# Patient Record
Sex: MALE | Race: WHITE | NOT HISPANIC OR LATINO | URBAN - METROPOLITAN AREA
[De-identification: names, ages, dates, MRNs, and addresses within clinical notes are randomized per-mention and may not be internally consistent; named-entity substitution may affect disease eponyms.]

---

## 2020-07-13 ENCOUNTER — OFFICE VISIT (OUTPATIENT)
Dept: URBAN - METROPOLITAN AREA CLINIC 113 | Facility: CLINIC | Age: 65
End: 2020-07-13

## 2020-07-25 ENCOUNTER — TELEPHONE ENCOUNTER (OUTPATIENT)
Dept: URBAN - METROPOLITAN AREA CLINIC 13 | Facility: CLINIC | Age: 65
End: 2020-07-25

## 2020-07-25 RX ORDER — PREDNISONE 10 MG/1
TABLET ORAL
Qty: 24 | Refills: 0 | OUTPATIENT
Start: 2019-01-28 | End: 2019-08-20

## 2020-07-25 RX ORDER — TRIAMCINOLONE ACETONIDE 1 MG/G
APPLY  AND RUB  IN A THIN FILM TO AFFECTED AREAS TWICE DAILY.(AM AND PM) CREAM TOPICAL
Refills: 0 | OUTPATIENT
Start: 2019-01-28 | End: 2019-08-20

## 2020-07-25 RX ORDER — AZITHROMYCIN DIHYDRATE 250 MG/1
TABLET, FILM COATED ORAL
Qty: 6 | Refills: 0 | OUTPATIENT
Start: 2019-01-07 | End: 2019-08-20

## 2020-07-25 RX ORDER — ASCORBIC ACID 1000 MG
TAKE 1 CAPSULE DAILY TABLET ORAL
Refills: 0 | OUTPATIENT
End: 2015-01-06

## 2020-07-25 RX ORDER — PRAVASTATIN SODIUM 40 MG/1
TAKE 1 TABLET DAILY TABLET ORAL
Refills: 0 | OUTPATIENT
Start: 2014-04-19 | End: 2019-12-09

## 2020-07-25 RX ORDER — BENZONATATE 100 MG/1
TAKE 1 CAPSULE 3 TIMES DAILY CAPSULE ORAL
Refills: 0 | OUTPATIENT
Start: 2019-01-07 | End: 2019-08-20

## 2020-07-25 RX ORDER — EZETIMIBE AND SIMVASTATIN 10; 80 MG/1; MG/1
TAKE 1 TABLET DAILY, PT STATES UNKNOWN DOSE TABLET ORAL
Refills: 0 | OUTPATIENT
End: 2015-01-06

## 2020-07-25 RX ORDER — FAMOTIDINE 40 MG/1
TAKE 1 TABLET DAILY AT BEDTIME TABLET ORAL
Qty: 30 | Refills: 2 | OUTPATIENT
Start: 2013-06-06 | End: 2015-01-06

## 2020-07-25 RX ORDER — METRONIDAZOLE 250 MG/1
TAKE 1 TABLET 3 TIMES DAILY TABLET ORAL
Qty: 30 | Refills: 0 | OUTPATIENT
Start: 2017-07-06 | End: 2017-07-27

## 2020-07-26 ENCOUNTER — TELEPHONE ENCOUNTER (OUTPATIENT)
Dept: URBAN - METROPOLITAN AREA CLINIC 13 | Facility: CLINIC | Age: 65
End: 2020-07-26

## 2020-07-26 RX ORDER — ALPRAZOLAM 0.5 MG/1
TABLET ORAL
Qty: 6 | Refills: 0 | Status: ACTIVE | COMMUNITY
Start: 2018-05-31

## 2020-07-26 RX ORDER — TADALAFIL 20 MG/1
TABLET, COATED ORAL
Qty: 18 | Refills: 0 | Status: ACTIVE | COMMUNITY
Start: 2018-12-13

## 2020-07-26 RX ORDER — TRIAMCINOLONE ACETONIDE 1 MG/G
CREAM TOPICAL
Qty: 454 | Refills: 0 | Status: ACTIVE | COMMUNITY
Start: 2019-01-28

## 2020-07-26 RX ORDER — PREDNISONE 5 MG/1
TABLET ORAL
Qty: 21 | Refills: 0 | Status: ACTIVE | COMMUNITY
Start: 2020-04-30

## 2020-07-26 RX ORDER — BENZONATATE 200 MG/1
CAPSULE ORAL
Qty: 30 | Refills: 0 | Status: ACTIVE | COMMUNITY
Start: 2013-01-31

## 2020-07-26 RX ORDER — PANTOPRAZOLE SODIUM 40 MG/1
TABLET, DELAYED RELEASE ORAL
Qty: 30 | Refills: 0 | Status: ACTIVE | COMMUNITY
Start: 2011-10-19

## 2020-07-26 RX ORDER — PREDNISONE 10 MG/1
TAKE 3TABS IN AM X4 DAYS, 2TABS DAILY X4 DAYS, THEN 1TAB DAILY X4 DAYS TABLET ORAL
Qty: 30 | Refills: 0 | Status: ACTIVE | COMMUNITY
Start: 2012-06-02

## 2020-07-26 RX ORDER — KETOCONAZOLE 200 MG/1
TABLET ORAL
Qty: 24 | Refills: 0 | Status: ACTIVE | COMMUNITY
Start: 2012-06-22

## 2020-07-26 RX ORDER — TRIAZOLAM 0.25 MG/1
TABLET ORAL
Qty: 12 | Refills: 0 | Status: ACTIVE | COMMUNITY
Start: 2014-08-19

## 2020-07-26 RX ORDER — BENZONATATE 200 MG/1
CAPSULE ORAL
Qty: 30 | Refills: 0 | Status: ACTIVE | COMMUNITY
Start: 2013-01-23

## 2020-07-26 RX ORDER — TRAMADOL HYDROCHLORIDE AND ACETAMINOPHEN 37.5; 325 MG/1; MG/1
TABLET, FILM COATED ORAL
Qty: 60 | Refills: 0 | Status: ACTIVE | COMMUNITY
Start: 2012-08-15

## 2020-07-26 RX ORDER — EZETIMIBE AND SIMVASTATIN 10; 40 MG/1; MG/1
TABLET ORAL
Qty: 90 | Refills: 0 | Status: ACTIVE | COMMUNITY
Start: 2011-10-29

## 2020-07-26 RX ORDER — PRAVASTATIN SODIUM 40 MG/1
TABLET ORAL
Qty: 90 | Refills: 0 | Status: ACTIVE | COMMUNITY
Start: 2014-04-19

## 2020-07-26 RX ORDER — ROSUVASTATIN CALCIUM 10 MG/1
TABLET, FILM COATED ORAL
Qty: 90 | Refills: 0 | Status: ACTIVE | COMMUNITY
Start: 2019-03-19

## 2020-07-26 RX ORDER — ROSUVASTATIN CALCIUM 10 MG/1
TAKE 1 TABLET DAILY TABLET, FILM COATED ORAL
Refills: 0 | Status: ACTIVE | COMMUNITY
Start: 2019-02-20

## 2020-07-26 RX ORDER — PREDNISONE 10 MG/1
TABLET ORAL
Qty: 24 | Refills: 0 | Status: ACTIVE | COMMUNITY
Start: 2019-01-28

## 2020-07-26 RX ORDER — LEVOFLOXACIN 750 MG/1
TABLET, FILM COATED ORAL
Qty: 7 | Refills: 0 | Status: ACTIVE | COMMUNITY
Start: 2012-12-26

## 2020-07-26 RX ORDER — AZITHROMYCIN DIHYDRATE 250 MG/1
TABLET, FILM COATED ORAL
Qty: 6 | Refills: 0 | Status: ACTIVE | COMMUNITY
Start: 2011-11-07

## 2020-07-26 RX ORDER — BENZONATATE 100 MG/1
CAPSULE ORAL
Qty: 20 | Refills: 0 | Status: ACTIVE | COMMUNITY
Start: 2014-04-16

## 2020-07-26 RX ORDER — CYCLOBENZAPRINE HYDROCHLORIDE 10 MG/1
TABLET, FILM COATED ORAL
Qty: 30 | Refills: 0 | Status: ACTIVE | COMMUNITY
Start: 2020-04-30

## 2020-07-26 RX ORDER — TADALAFIL 20 MG/1
TABLET, FILM COATED ORAL
Qty: 6 | Refills: 0 | Status: ACTIVE | COMMUNITY
Start: 2011-10-10

## 2020-07-26 RX ORDER — TRAMADOL HYDROCHLORIDE 50 MG/1
TAKE 1 TABLET BY MOUTH TWICE A DAY AS NEEDED TABLET ORAL
Qty: 20 | Refills: 0 | Status: ACTIVE | COMMUNITY
Start: 2020-04-30

## 2020-07-26 RX ORDER — IBUPROFEN 600 MG/1
TABLET ORAL
Qty: 30 | Refills: 0 | Status: ACTIVE | COMMUNITY
Start: 2013-05-04

## 2020-07-26 RX ORDER — CLONAZEPAM 1 MG/1
TABLET ORAL
Qty: 12 | Refills: 0 | Status: ACTIVE | COMMUNITY
Start: 2020-06-26

## 2020-07-26 RX ORDER — PANTOPRAZOLE SODIUM 40 MG/1
TAKE 1 TABLET DAILY TABLET, DELAYED RELEASE ORAL
Refills: 0 | Status: ACTIVE | COMMUNITY

## 2020-07-26 RX ORDER — MUPIROCIN 20 MG/G
OINTMENT TOPICAL
Qty: 22 | Refills: 0 | Status: ACTIVE | COMMUNITY
Start: 2014-05-21

## 2020-07-26 RX ORDER — CEPHALEXIN 500 MG/1
CAPSULE ORAL
Qty: 14 | Refills: 0 | Status: ACTIVE | COMMUNITY
Start: 2014-05-21

## 2020-07-26 RX ORDER — KETOCONAZOLE 200 MG/1
TABLET ORAL
Qty: 26 | Refills: 0 | Status: ACTIVE | COMMUNITY
Start: 2020-03-30

## 2020-07-26 RX ORDER — AZITHROMYCIN DIHYDRATE 250 MG/1
TABLET, FILM COATED ORAL
Qty: 6 | Refills: 0 | Status: ACTIVE | COMMUNITY
Start: 2013-01-31

## 2020-07-26 RX ORDER — AZITHROMYCIN DIHYDRATE 250 MG/1
TABLET, FILM COATED ORAL
Qty: 6 | Refills: 0 | Status: ACTIVE | COMMUNITY
Start: 2019-01-07

## 2020-07-26 RX ORDER — TADALAFIL 20 MG/1
TAKE 1 TABLET 1 HOUR BEFORE ACTIVITY AS NEEDED TABLET, FILM COATED ORAL
Refills: 0 | Status: ACTIVE | COMMUNITY

## 2020-07-26 RX ORDER — CEPHALEXIN 500 MG/1
CAPSULE ORAL
Qty: 28 | Refills: 0 | Status: ACTIVE | COMMUNITY
Start: 2013-05-04

## 2020-07-26 RX ORDER — MULTIVITAMIN
TAKE 1 CAPSULE DAILY CAPSULE ORAL
Refills: 0 | Status: ACTIVE | COMMUNITY

## 2020-07-26 RX ORDER — BENZONATATE 100 MG/1
CAPSULE ORAL
Qty: 20 | Refills: 0 | Status: ACTIVE | COMMUNITY
Start: 2019-01-07

## 2020-07-26 RX ORDER — PREDNISONE 10 MG/1
TABLET ORAL
Qty: 30 | Refills: 0 | Status: ACTIVE | COMMUNITY
Start: 2012-06-22

## 2020-07-26 RX ORDER — PREDNISONE 5 MG/1
TABLET ORAL
Qty: 21 | Refills: 0 | Status: ACTIVE | COMMUNITY
Start: 2011-11-07

## 2020-07-26 RX ORDER — VALACYCLOVIR 1 G/1
TABLET, FILM COATED ORAL
Qty: 21 | Refills: 0 | Status: ACTIVE | COMMUNITY
Start: 2020-03-30

## 2020-08-25 ENCOUNTER — OFFICE VISIT (OUTPATIENT)
Dept: URBAN - METROPOLITAN AREA CLINIC 113 | Facility: CLINIC | Age: 65
End: 2020-08-25
Payer: COMMERCIAL

## 2020-08-25 VITALS
DIASTOLIC BLOOD PRESSURE: 70 MMHG | BODY MASS INDEX: 30.16 KG/M2 | TEMPERATURE: 97.5 F | HEIGHT: 74 IN | SYSTOLIC BLOOD PRESSURE: 127 MMHG | WEIGHT: 235 LBS | HEART RATE: 55 BPM | RESPIRATION RATE: 20 BRPM

## 2020-08-25 DIAGNOSIS — R13.13 PHARYNGEAL DYSPHAGIA: ICD-10-CM

## 2020-08-25 DIAGNOSIS — K21.0 GASTROESOPHAGEAL REFLUX DISEASE WITH ESOPHAGITIS: ICD-10-CM

## 2020-08-25 DIAGNOSIS — Z86.010 HISTORY OF COLON POLYPS: ICD-10-CM

## 2020-08-25 PROCEDURE — 99213 OFFICE O/P EST LOW 20 MIN: CPT | Performed by: INTERNAL MEDICINE

## 2020-08-25 RX ORDER — MULTIVITAMIN
TAKE 1 CAPSULE DAILY CAPSULE ORAL
Refills: 0 | Status: ACTIVE | COMMUNITY

## 2020-08-25 RX ORDER — PANTOPRAZOLE SODIUM 40 MG/1
TAKE 1 TABLET DAILY TABLET, DELAYED RELEASE ORAL
Refills: 0 | Status: ACTIVE | COMMUNITY

## 2020-08-25 RX ORDER — ROSUVASTATIN CALCIUM 10 MG/1
TAKE 1 TABLET DAILY TABLET, FILM COATED ORAL
Refills: 0 | Status: ACTIVE | COMMUNITY
Start: 2019-02-20

## 2020-08-25 RX ORDER — CLONAZEPAM 1 MG/1
TABLET ORAL
Qty: 12 | Refills: 0 | Status: ACTIVE | COMMUNITY
Start: 2020-06-26

## 2020-08-25 RX ORDER — TADALAFIL 20 MG/1
TAKE 1 TABLET 1 HOUR BEFORE ACTIVITY AS NEEDED TABLET, FILM COATED ORAL
Refills: 0 | Status: ACTIVE | COMMUNITY

## 2020-08-25 NOTE — HPI-TODAY'S VISIT:
Mr. Costello is a 65-year-old male with a history of GERD, short-segment nondysplastic Steinberg's esophagus, and chronic irregular bowel habits, presenting for follow up. he was last seen here in December 2019 for follow up.   He was previously seen on 8/20/19 for follow-up after EGD. His dysphagia persisted despite empiric esophageal dilation, and was thought to be related to dysmotility. He was encouraged to increase pantoprazole to twice daily with consideration for a trial of domperidone.  At the time of his last visit, he denied further difficulty with swallowing and reflux was controlled with once daily pantoprazole. He reported occasional mid to upper abdominal "gas" pains following a meal, which were infrequent. He denied nausea, vomiting, change in bowel habits, or red blood per rectum.  Review of record Upper endoscopy was performed on February 20, 2019, and although no endoscopic abnormality to explain his dysphagia was seen, he was empirically dilated with a Adams dilator at 46 and 48 [place] diameters, respectively. Surveillance biopsies for Steinberg's were obtained. These biopsies only showed reflux changes, with no dysplasia and no rocael Steinberg's epithelium. The patient returns today doing well.  He has no heartburn or dysphagia.  He has no specific GI complaints at this time.

## 2022-08-15 ENCOUNTER — LAB OUTSIDE AN ENCOUNTER (OUTPATIENT)
Dept: URBAN - METROPOLITAN AREA CLINIC 113 | Facility: CLINIC | Age: 67
End: 2022-08-15

## 2022-08-15 ENCOUNTER — OFFICE VISIT (OUTPATIENT)
Dept: URBAN - METROPOLITAN AREA CLINIC 113 | Facility: CLINIC | Age: 67
End: 2022-08-15
Payer: COMMERCIAL

## 2022-08-15 VITALS
WEIGHT: 233 LBS | HEART RATE: 56 BPM | HEIGHT: 74 IN | DIASTOLIC BLOOD PRESSURE: 64 MMHG | BODY MASS INDEX: 29.9 KG/M2 | RESPIRATION RATE: 20 BRPM | TEMPERATURE: 97.3 F | SYSTOLIC BLOOD PRESSURE: 122 MMHG

## 2022-08-15 DIAGNOSIS — Z86.010 HISTORY OF COLON POLYPS: ICD-10-CM

## 2022-08-15 DIAGNOSIS — K21.00 CHRONIC REFLUX ESOPHAGITIS: ICD-10-CM

## 2022-08-15 DIAGNOSIS — K22.70 BARRETT'S ESOPHAGUS WITHOUT DYSPLASIA: ICD-10-CM

## 2022-08-15 DIAGNOSIS — R13.13 PHARYNGEAL DYSPHAGIA: ICD-10-CM

## 2022-08-15 PROCEDURE — 99213 OFFICE O/P EST LOW 20 MIN: CPT | Performed by: INTERNAL MEDICINE

## 2022-08-15 RX ORDER — ROSUVASTATIN CALCIUM 10 MG/1
TAKE 1 TABLET DAILY TABLET, FILM COATED ORAL
Refills: 0 | Status: ACTIVE | COMMUNITY
Start: 2019-02-20

## 2022-08-15 RX ORDER — PANTOPRAZOLE SODIUM 40 MG/1
TAKE 1 TABLET DAILY TABLET, DELAYED RELEASE ORAL
Refills: 0 | Status: ACTIVE | COMMUNITY

## 2022-08-15 RX ORDER — TADALAFIL 20 MG/1
TAKE 1 TABLET 1 HOUR BEFORE ACTIVITY AS NEEDED TABLET, FILM COATED ORAL
Refills: 0 | Status: ACTIVE | COMMUNITY

## 2022-08-15 RX ORDER — MULTIVITAMIN
TAKE 1 CAPSULE DAILY CAPSULE ORAL
Refills: 0 | Status: ACTIVE | COMMUNITY

## 2022-08-15 RX ORDER — CLONAZEPAM 1 MG/1
TABLET ORAL
Qty: 12 | Refills: 0 | Status: ON HOLD | COMMUNITY
Start: 2020-06-26

## 2022-08-15 RX ORDER — ASPIRIN 81 MG/1
1 TABLET TABLET, COATED ORAL ONCE A DAY
Status: ACTIVE | COMMUNITY

## 2022-08-15 NOTE — HPI-TODAY'S VISIT:
Mr. Costello is a 67-year-old male with a history of GERD, short-segment nondysplastic Steinberg's esophagus, and chronic irregular bowel habits, presenting for follow up. He was last seen here on August 25, 2020.   He was previously seen on 8/20/19 for follow-up after EGD. His dysphagia had persisted despite empiric esophageal dilation, and was thought to be related to dysmotility. He was encouraged to increase pantoprazole to twice daily with consideration for a trial of domperidone.   At the time of his last visit, he denied further difficulty with swallowing and reflux was controlled with once daily pantoprazole. He reported occasional mid to upper abdominal "gas" pains following a meal, which were infrequent. He denied nausea, vomiting, change in bowel habits, or red blood per rectum.   The patient currently notes that his reflux symptoms are controlled on pantoprazole 40 mg daily.  He is not having significant dysphagia.  He did have a colon polyp in 2017, and is due for surveillance colonoscopy this year.    Review of record Upper endoscopy was performed on February 20, 2019, and although no endoscopic abnormality to explain his dysphagia was seen, he was empirically dilated with a Adams dilator at 46 and 48 [place] diameters, respectively. Surveillance biopsies for Steinberg's were obtained. These biopsies only showed reflux changes, with no dysplasia and no rocael Steinberg's epithelium.  Colon examination last performed 9/25/2017 showed left colon diverticulosis and a 7 mm sigmoid colon polyp which was a tubular adenoma.

## 2022-08-17 PROBLEM — 266433003 GASTROESOPHAGEAL REFLUX DISEASE WITH ESOPHAGITIS: Status: ACTIVE | Noted: 2022-08-15

## 2022-09-02 PROBLEM — 266433003 GASTROESOPHAGEAL REFLUX DISEASE WITH ESOPHAGITIS (DISORDER): Status: ACTIVE | Noted: 2022-09-02

## 2022-09-23 ENCOUNTER — OFFICE VISIT (OUTPATIENT)
Dept: URBAN - METROPOLITAN AREA SURGERY CENTER 25 | Facility: SURGERY CENTER | Age: 67
End: 2022-09-23

## 2022-10-04 ENCOUNTER — CLAIMS CREATED FROM THE CLAIM WINDOW (OUTPATIENT)
Dept: URBAN - METROPOLITAN AREA CLINIC 4 | Facility: CLINIC | Age: 67
End: 2022-10-04
Payer: COMMERCIAL

## 2022-10-04 ENCOUNTER — OFFICE VISIT (OUTPATIENT)
Dept: URBAN - METROPOLITAN AREA SURGERY CENTER 25 | Facility: SURGERY CENTER | Age: 67
End: 2022-10-04
Payer: COMMERCIAL

## 2022-10-04 ENCOUNTER — WEB ENCOUNTER (OUTPATIENT)
Dept: URBAN - METROPOLITAN AREA SURGERY CENTER 25 | Facility: SURGERY CENTER | Age: 67
End: 2022-10-04

## 2022-10-04 DIAGNOSIS — K31.89 OTHER DISEASES OF STOMACH AND DUODENUM: ICD-10-CM

## 2022-10-04 DIAGNOSIS — K22.70 BARRETT ESOPHAGUS: ICD-10-CM

## 2022-10-04 DIAGNOSIS — K22.89 OTHER SPECIFIED DISEASE OF ESOPHAGUS: ICD-10-CM

## 2022-10-04 DIAGNOSIS — K21.9 GASTROESOPHAGEAL REFLUX: ICD-10-CM

## 2022-10-04 DIAGNOSIS — K29.70 GASTRITIS, UNSPECIFIED, WITHOUT BLEEDING: ICD-10-CM

## 2022-10-04 PROCEDURE — G8907 PT DOC NO EVENTS ON DISCHARG: HCPCS | Performed by: INTERNAL MEDICINE

## 2022-10-04 PROCEDURE — 88305 TISSUE EXAM BY PATHOLOGIST: CPT | Performed by: PATHOLOGY

## 2022-10-04 PROCEDURE — 88312 SPECIAL STAINS GROUP 1: CPT | Performed by: PATHOLOGY

## 2022-10-04 PROCEDURE — 43239 EGD BIOPSY SINGLE/MULTIPLE: CPT | Performed by: INTERNAL MEDICINE

## 2022-10-04 RX ORDER — PANTOPRAZOLE SODIUM 40 MG/1
TAKE 1 TABLET DAILY TABLET, DELAYED RELEASE ORAL
Refills: 0 | Status: ACTIVE | COMMUNITY

## 2022-10-04 RX ORDER — ROSUVASTATIN CALCIUM 10 MG/1
TAKE 1 TABLET DAILY TABLET, FILM COATED ORAL
Refills: 0 | Status: ACTIVE | COMMUNITY
Start: 2019-02-20

## 2022-10-04 RX ORDER — TADALAFIL 20 MG/1
TAKE 1 TABLET 1 HOUR BEFORE ACTIVITY AS NEEDED TABLET, FILM COATED ORAL
Refills: 0 | Status: ACTIVE | COMMUNITY

## 2022-10-04 RX ORDER — MULTIVITAMIN
TAKE 1 CAPSULE DAILY CAPSULE ORAL
Refills: 0 | Status: ACTIVE | COMMUNITY

## 2022-10-04 RX ORDER — ASPIRIN 81 MG/1
1 TABLET TABLET, COATED ORAL ONCE A DAY
Status: ACTIVE | COMMUNITY

## 2022-10-04 RX ORDER — CLONAZEPAM 1 MG/1
TABLET ORAL
Qty: 12 | Refills: 0 | Status: ON HOLD | COMMUNITY
Start: 2020-06-26

## 2022-10-13 ENCOUNTER — CLAIMS CREATED FROM THE CLAIM WINDOW (OUTPATIENT)
Dept: URBAN - METROPOLITAN AREA CLINIC 4 | Facility: CLINIC | Age: 67
End: 2022-10-13
Payer: COMMERCIAL

## 2022-10-13 ENCOUNTER — OFFICE VISIT (OUTPATIENT)
Dept: URBAN - METROPOLITAN AREA SURGERY CENTER 25 | Facility: SURGERY CENTER | Age: 67
End: 2022-10-13
Payer: COMMERCIAL

## 2022-10-13 DIAGNOSIS — K63.5 HYPERPLASTIC POLYP OF SIGMOID COLON: ICD-10-CM

## 2022-10-13 DIAGNOSIS — K63.89 OTHER SPECIFIED DISEASES OF INTESTINE: ICD-10-CM

## 2022-10-13 DIAGNOSIS — Z86.010 HISTORY OF COLON POLYPS: ICD-10-CM

## 2022-10-13 PROCEDURE — 88305 TISSUE EXAM BY PATHOLOGIST: CPT | Performed by: PATHOLOGY

## 2022-10-13 PROCEDURE — G8907 PT DOC NO EVENTS ON DISCHARG: HCPCS | Performed by: INTERNAL MEDICINE

## 2022-10-13 PROCEDURE — 45385 COLONOSCOPY W/LESION REMOVAL: CPT | Performed by: INTERNAL MEDICINE

## 2022-10-13 RX ORDER — ASPIRIN 81 MG/1
1 TABLET TABLET, COATED ORAL ONCE A DAY
Status: ACTIVE | COMMUNITY

## 2022-10-13 RX ORDER — CLONAZEPAM 1 MG/1
TABLET ORAL
Qty: 12 | Refills: 0 | Status: ON HOLD | COMMUNITY
Start: 2020-06-26

## 2022-10-13 RX ORDER — MULTIVITAMIN
TAKE 1 CAPSULE DAILY CAPSULE ORAL
Refills: 0 | Status: ACTIVE | COMMUNITY

## 2022-10-13 RX ORDER — TADALAFIL 20 MG/1
TAKE 1 TABLET 1 HOUR BEFORE ACTIVITY AS NEEDED TABLET, FILM COATED ORAL
Refills: 0 | Status: ACTIVE | COMMUNITY

## 2022-10-13 RX ORDER — PANTOPRAZOLE SODIUM 40 MG/1
TAKE 1 TABLET DAILY TABLET, DELAYED RELEASE ORAL
Refills: 0 | Status: ACTIVE | COMMUNITY

## 2022-10-13 RX ORDER — ROSUVASTATIN CALCIUM 10 MG/1
TAKE 1 TABLET DAILY TABLET, FILM COATED ORAL
Refills: 0 | Status: ACTIVE | COMMUNITY
Start: 2019-02-20

## 2022-10-20 PROBLEM — 302914006 BARRETT ESOPHAGUS: Status: ACTIVE | Noted: 2022-10-20

## 2022-10-20 PROBLEM — 249496004 ESOPHAGEAL REFLUX FINDING: Status: ACTIVE | Noted: 2022-10-20

## 2022-12-14 ENCOUNTER — OFFICE VISIT (OUTPATIENT)
Dept: URBAN - METROPOLITAN AREA CLINIC 113 | Facility: CLINIC | Age: 67
End: 2022-12-14

## 2023-02-10 ENCOUNTER — OFFICE VISIT (OUTPATIENT)
Dept: URBAN - METROPOLITAN AREA CLINIC 113 | Facility: CLINIC | Age: 68
End: 2023-02-10
Payer: COMMERCIAL

## 2023-02-10 ENCOUNTER — DASHBOARD ENCOUNTERS (OUTPATIENT)
Age: 68
End: 2023-02-10

## 2023-02-10 VITALS
HEART RATE: 59 BPM | TEMPERATURE: 97.8 F | BODY MASS INDEX: 29.52 KG/M2 | HEIGHT: 74 IN | SYSTOLIC BLOOD PRESSURE: 126 MMHG | DIASTOLIC BLOOD PRESSURE: 69 MMHG | WEIGHT: 230 LBS | RESPIRATION RATE: 18 BRPM

## 2023-02-10 DIAGNOSIS — R13.13 PHARYNGEAL DYSPHAGIA: ICD-10-CM

## 2023-02-10 DIAGNOSIS — Z86.010 HISTORY OF COLON POLYPS: ICD-10-CM

## 2023-02-10 DIAGNOSIS — K22.70 BARRETT'S ESOPHAGUS WITHOUT DYSPLASIA: ICD-10-CM

## 2023-02-10 DIAGNOSIS — K21.9 GASTROESOPHAGEAL REFLUX DISEASE WITHOUT ESOPHAGITIS: ICD-10-CM

## 2023-02-10 PROCEDURE — 99213 OFFICE O/P EST LOW 20 MIN: CPT | Performed by: INTERNAL MEDICINE

## 2023-02-10 RX ORDER — MULTIVITAMIN
TAKE 1 CAPSULE DAILY CAPSULE ORAL
Refills: 0 | Status: ACTIVE | COMMUNITY

## 2023-02-10 RX ORDER — TADALAFIL 20 MG/1
TAKE 1 TABLET 1 HOUR BEFORE ACTIVITY AS NEEDED TABLET, FILM COATED ORAL
Refills: 0 | Status: ACTIVE | COMMUNITY

## 2023-02-10 RX ORDER — ROSUVASTATIN CALCIUM 10 MG/1
TAKE 1 TABLET DAILY TABLET, FILM COATED ORAL
Refills: 0 | Status: ACTIVE | COMMUNITY
Start: 2019-02-20

## 2023-02-10 RX ORDER — PANTOPRAZOLE SODIUM 40 MG/1
TAKE 1 TABLET DAILY TABLET, DELAYED RELEASE ORAL
Refills: 0 | Status: ACTIVE | COMMUNITY

## 2023-02-10 RX ORDER — ASPIRIN 81 MG/1
1 TABLET TABLET, COATED ORAL ONCE A DAY
Status: ACTIVE | COMMUNITY

## 2023-02-10 RX ORDER — CLONAZEPAM 1 MG/1
TABLET ORAL
Qty: 12 | Refills: 0 | Status: ON HOLD | COMMUNITY
Start: 2020-06-26

## 2023-02-10 NOTE — HPI-TODAY'S VISIT:
Mr. Costello is a 67-year-old male with a history of GERD, short-segment nondysplastic Steinberg's esophagus, and chronic irregular bowel habits, presenting for follow up. He was last seen here on August 15, 2022.  He was previously seen on 8/20/19 for follow-up after EGD. His dysphagia had persisted despite empiric esophageal dilation, and was thought to be related to dysmotility. He was encouraged to increase pantoprazole to twice daily with consideration for a trial of domperidone.   At the time of his last visit, he denied further difficulty with swallowing and reflux was controlled with once daily pantoprazole. He reported occasional mid to upper abdominal "gas" pains following a meal, which were infrequent. He denied nausea, vomiting, change in bowel habits, or red blood per rectum.   The patient noted at his last office visit that his reflux symptoms were controlled on pantoprazole 40 mg daily.  He was not having significant dysphagia.  He did have a colon polyp in 2017, and was due for surveillance colonoscopy.  The pain underwent upper GI endoscopy on October 9, 2022.  This examination showed short segment Steinberg's epithelium grossly, the biopsies were negative for Steinberg's.  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  H. pylori biopsies were negative.  The patient's colonoscopy done on October 13, 2022 showed a 6 mm hyperplastic polyp in the sigmoid, and tattoo in the sigmoid which showed no evidence of recurrent polyps, grade 1 internal hemorrhoids and left colon diverticulosis.  The patient has no GI complaints today.  Reflux symptoms are controlled on pantoprazole.  He has no other issues at present.    Review of record Upper endoscopy was performed on February 20, 2019, and although no endoscopic abnormality to explain his dysphagia was seen, he was empirically dilated with a Adams dilator at 46 and 48 [place] diameters, respectively. Surveillance biopsies for Steinberg's were obtained. These biopsies only showed reflux changes, with no dysplasia and no rocael Steinberg's epithelium.  Colon examination last performed 9/25/2017 showed left colon diverticulosis and a 7 mm sigmoid colon polyp which was a tubular adenoma.

## 2023-02-10 NOTE — PHYSICAL EXAM CONSTITUTIONAL:
in no acute distress, well developed, well nourished, ambulating without difficulty, normal communication ability
Patient informed/Family informed/Explanation of wait/Darkened room

## 2023-02-12 PROBLEM — 428283002 HISTORY OF POLYP OF COLON: Status: ACTIVE | Noted: 2022-08-15

## 2023-02-12 PROBLEM — 302914006: Status: ACTIVE | Noted: 2022-08-15

## 2023-02-12 PROBLEM — 21101000119105 PHARYNGEAL DYSPHAGIA: Status: ACTIVE | Noted: 2022-08-15

## 2023-02-12 PROBLEM — 266435005: Status: ACTIVE | Noted: 2023-02-12
